# Patient Record
Sex: MALE | Race: WHITE | NOT HISPANIC OR LATINO | Employment: FULL TIME | URBAN - METROPOLITAN AREA
[De-identification: names, ages, dates, MRNs, and addresses within clinical notes are randomized per-mention and may not be internally consistent; named-entity substitution may affect disease eponyms.]

---

## 2021-08-31 ENCOUNTER — HOSPITAL ENCOUNTER (EMERGENCY)
Facility: MEDICAL CENTER | Age: 51
End: 2021-08-31
Attending: EMERGENCY MEDICINE
Payer: OTHER MISCELLANEOUS

## 2021-08-31 VITALS
RESPIRATION RATE: 16 BRPM | SYSTOLIC BLOOD PRESSURE: 110 MMHG | HEART RATE: 79 BPM | OXYGEN SATURATION: 96 % | WEIGHT: 234.13 LBS | TEMPERATURE: 97.6 F | DIASTOLIC BLOOD PRESSURE: 77 MMHG

## 2021-08-31 DIAGNOSIS — G89.29 CHRONIC PAIN OF LEFT KNEE: ICD-10-CM

## 2021-08-31 DIAGNOSIS — M25.562 CHRONIC PAIN OF LEFT KNEE: ICD-10-CM

## 2021-08-31 PROCEDURE — 99283 EMERGENCY DEPT VISIT LOW MDM: CPT

## 2021-08-31 PROCEDURE — A9270 NON-COVERED ITEM OR SERVICE: HCPCS | Performed by: EMERGENCY MEDICINE

## 2021-08-31 PROCEDURE — 700102 HCHG RX REV CODE 250 W/ 637 OVERRIDE(OP): Performed by: EMERGENCY MEDICINE

## 2021-08-31 RX ORDER — IBUPROFEN 600 MG/1
600 TABLET ORAL ONCE
Status: COMPLETED | OUTPATIENT
Start: 2021-08-31 | End: 2021-08-31

## 2021-08-31 RX ADMIN — IBUPROFEN 600 MG: 600 TABLET ORAL at 14:23

## 2021-08-31 ASSESSMENT — LIFESTYLE VARIABLES: DO YOU DRINK ALCOHOL: NO

## 2021-08-31 NOTE — ED PROVIDER NOTES
ED Provider Note    Scribed for Loretta Gonsalez M.D. by Samantha Deras. 8/31/2021, 1:29 PM.    Primary care provider: None  Means of arrival: Walk in  History obtained from: patient   History limited by: none    CHIEF COMPLAINT  Chief Complaint   Patient presents with   • Knee Pain     ambulates limping in triage c/o bilateral knee pain x 3 months. has hx of meniscus tear/surgery in left knee.        HPI  Augustus Burciaga is a 50 y.o. male who presents to the Emergency Department for bilateral knee pain. Patient has a history of spinal stenosis and states he has not received his injections for 6 weeks. He has increased pain in his back and has been walking differently causing increased knee pain. He has additional past medical history of meniscus tear and states he has chronic knee pain, but it is worse now.     REVIEW OF SYSTEMS  Pertinent positives include bilateral knee pain and back pain. Pertinent negatives include no weakness or falls.    PAST MEDICAL HISTORY   He has a history of spinal stenosis and mensicus tear    SURGICAL HISTORY  patient denies any surgical history    FAMILY HISTORY  Patient denies a pertinent family history.    CURRENT MEDICATIONS  Home Medications     Reviewed by Lachelle Marino R.N. (Registered Nurse) on 08/31/21 at 1221  Med List Status: Complete   Medication Last Dose Status        Patient Fly Taking any Medications                       ALLERGIES  No Known Allergies    PHYSICAL EXAM  VITAL SIGNS: /78   Pulse 75   Temp 36 °C (96.8 °F) (Temporal)   Resp 17   Wt 106 kg (234 lb 2.1 oz)   SpO2 93%   Constitutional: Alert in no apparent distress. Well appearing  HENT: Normocephalic, Atraumatic, Bilateral external ears normal. Nose normal.   Eyes:  Conjunctiva normal, non-icteric.   Lungs: Non-labored respirations  Skin: Warm, Dry, No erythema, No rash.   Neurologic: Alert, Grossly non-focal.   Psychiatric: Affect normal, Judgment normal, Mood normal, Appears appropriate and not  intoxicated.   Extremities: no significant knee swelling.    COURSE & MEDICAL DECISION MAKING  Pertinent Labs & Imaging studies reviewed. (See chart for details)    1:29 PM - Patient seen and examined at bedside. I informed the patient that most his symptoms are chronic and there is no emergent treatment needed from an ER standpoint.  I do not think imaging is indicated at this time.  Patient will be placed in a knee brace/immobilizer. recommended following with a PCP for management of his chronic problems. Patient will be treated with ibuprofen 600 mg. Recommended tylenol and ibuprofen at home as well as a knee brace from the drug store for additional support. Discussed return precautions. Patient will be discharged at this time. He verbalizes agreement with discharge and plan of care.       The patient will return for new or worsening symptoms and is stable at the time of discharge. Patient was given return precautions. Patient and/or family member verbalizes understanding and will comply.    DISPOSITION:  Patient will be discharged home in stable condition.    FOLLOW UP:  [facility], Emergency Dept  [address] [facility] [id number] [phone]    Return for worsening pain swelling fevers or other concerns.    [address] [id number] [phone]        [facility]  [address] [facility] [id number] [phone]        FINAL IMPRESSION  1. Chronic pain of left knee           This dictation has been created using voice recognition software and/or scribes. The accuracy of the dictation is limited by the abilities of the software and the expertise of the scribes. I expect there may be some errors of grammar and possibly content. I made every attempt to manually correct the errors within my dictation. However, errors related to voice recognition software and/or scribes may still exist and should be interpreted within  the appropriate context.     I, Samantha Deras (Scribe), am scribing for, and in the presence of, Loretta Gonsalez M.D..    Electronically signed by: Samantha Deras (Reece), 8/31/2021    I, Loretta Gonsalez M.D. personally performed the services described in this documentation, as scribed by Samantha Deras in my presence, and it is both accurate and complete. E    The note accurately reflects work and decisions made by me.  Loretta Gonsalez M.D.  8/31/2021  4:01 PM

## 2021-08-31 NOTE — ED NOTES
Pt report chronic knee pain to bilateral knees. Left > right. States he just moved to area and has not established with any physicians.

## 2021-08-31 NOTE — ED NOTES
DC home with written and verbal education for knee pain. He is ambulatory with knee immobilizer. Escorted to [facility]. All DC instructions with pt at time of DC

## 2021-10-11 ENCOUNTER — HOSPITAL ENCOUNTER (EMERGENCY)
Facility: MEDICAL CENTER | Age: 51
End: 2021-10-11
Attending: EMERGENCY MEDICINE
Payer: MEDICAID

## 2021-10-11 ENCOUNTER — APPOINTMENT (OUTPATIENT)
Dept: RADIOLOGY | Facility: MEDICAL CENTER | Age: 51
End: 2021-10-11
Attending: EMERGENCY MEDICINE
Payer: MEDICAID

## 2021-10-11 VITALS
SYSTOLIC BLOOD PRESSURE: 113 MMHG | WEIGHT: 219.8 LBS | OXYGEN SATURATION: 94 % | RESPIRATION RATE: 16 BRPM | HEIGHT: 73 IN | DIASTOLIC BLOOD PRESSURE: 92 MMHG | BODY MASS INDEX: 29.13 KG/M2 | TEMPERATURE: 97.8 F | HEART RATE: 61 BPM

## 2021-10-11 DIAGNOSIS — R50.9 FEVER, UNSPECIFIED FEVER CAUSE: ICD-10-CM

## 2021-10-11 DIAGNOSIS — R05.9 COUGH: ICD-10-CM

## 2021-10-11 LAB
ALBUMIN SERPL BCP-MCNC: 3.9 G/DL (ref 3.2–4.9)
ALBUMIN/GLOB SERPL: 1.1 G/DL
ALP SERPL-CCNC: 91 U/L (ref 30–99)
ALT SERPL-CCNC: 13 U/L (ref 2–50)
ANION GAP SERPL CALC-SCNC: 21 MMOL/L (ref 7–16)
AST SERPL-CCNC: 17 U/L (ref 12–45)
BASOPHILS # BLD AUTO: 0.4 % (ref 0–1.8)
BASOPHILS # BLD: 0.05 K/UL (ref 0–0.12)
BILIRUB SERPL-MCNC: 0.2 MG/DL (ref 0.1–1.5)
BUN SERPL-MCNC: 61 MG/DL (ref 8–22)
CALCIUM SERPL-MCNC: 9.1 MG/DL (ref 8.5–10.5)
CHLORIDE SERPL-SCNC: 97 MMOL/L (ref 96–112)
CO2 SERPL-SCNC: 16 MMOL/L (ref 20–33)
CREAT SERPL-MCNC: 6.75 MG/DL (ref 0.5–1.4)
EOSINOPHIL # BLD AUTO: 0.01 K/UL (ref 0–0.51)
EOSINOPHIL NFR BLD: 0.1 % (ref 0–6.9)
ERYTHROCYTE [DISTWIDTH] IN BLOOD BY AUTOMATED COUNT: 51.6 FL (ref 35.9–50)
FLUAV RNA SPEC QL NAA+PROBE: NEGATIVE
FLUBV RNA SPEC QL NAA+PROBE: NEGATIVE
GLOBULIN SER CALC-MCNC: 3.7 G/DL (ref 1.9–3.5)
GLUCOSE SERPL-MCNC: 101 MG/DL (ref 65–99)
HCT VFR BLD AUTO: 50.3 % (ref 42–52)
HGB BLD-MCNC: 16.6 G/DL (ref 14–18)
IMM GRANULOCYTES # BLD AUTO: 0.2 K/UL (ref 0–0.11)
IMM GRANULOCYTES NFR BLD AUTO: 1.6 % (ref 0–0.9)
LYMPHOCYTES # BLD AUTO: 1.97 K/UL (ref 1–4.8)
LYMPHOCYTES NFR BLD: 16 % (ref 22–41)
MCH RBC QN AUTO: 31.4 PG (ref 27–33)
MCHC RBC AUTO-ENTMCNC: 33 G/DL (ref 33.7–35.3)
MCV RBC AUTO: 95.1 FL (ref 81.4–97.8)
MONOCYTES # BLD AUTO: 1.23 K/UL (ref 0–0.85)
MONOCYTES NFR BLD AUTO: 10 % (ref 0–13.4)
NEUTROPHILS # BLD AUTO: 8.85 K/UL (ref 1.82–7.42)
NEUTROPHILS NFR BLD: 71.9 % (ref 44–72)
NRBC # BLD AUTO: 0 K/UL
NRBC BLD-RTO: 0 /100 WBC
NT-PROBNP SERPL IA-MCNC: 549 PG/ML (ref 0–125)
PLATELET # BLD AUTO: 177 K/UL (ref 164–446)
PMV BLD AUTO: 12.2 FL (ref 9–12.9)
POTASSIUM SERPL-SCNC: 5.6 MMOL/L (ref 3.6–5.5)
PROT SERPL-MCNC: 7.6 G/DL (ref 6–8.2)
RBC # BLD AUTO: 5.29 M/UL (ref 4.7–6.1)
RSV RNA SPEC QL NAA+PROBE: NEGATIVE
SARS-COV-2 RNA RESP QL NAA+PROBE: DETECTED
SODIUM SERPL-SCNC: 134 MMOL/L (ref 135–145)
SPECIMEN SOURCE: ABNORMAL
WBC # BLD AUTO: 12.3 K/UL (ref 4.8–10.8)

## 2021-10-11 PROCEDURE — 83880 ASSAY OF NATRIURETIC PEPTIDE: CPT

## 2021-10-11 PROCEDURE — 93005 ELECTROCARDIOGRAM TRACING: CPT | Performed by: EMERGENCY MEDICINE

## 2021-10-11 PROCEDURE — 80053 COMPREHEN METABOLIC PANEL: CPT

## 2021-10-11 PROCEDURE — 85025 COMPLETE CBC W/AUTO DIFF WBC: CPT

## 2021-10-11 PROCEDURE — 99284 EMERGENCY DEPT VISIT MOD MDM: CPT

## 2021-10-11 PROCEDURE — C9803 HOPD COVID-19 SPEC COLLECT: HCPCS | Performed by: EMERGENCY MEDICINE

## 2021-10-11 PROCEDURE — 0241U HCHG SARS-COV-2 COVID-19 NFCT DS RESP RNA 4 TRGT MIC: CPT

## 2021-10-11 PROCEDURE — 71045 X-RAY EXAM CHEST 1 VIEW: CPT

## 2021-10-11 NOTE — ED PROVIDER NOTES
ED Provider Note    Chief Complaint:   Shortness of breath, cough, fatigue    HPI:  Augustus Burciaga is a very pleasant 50-year-old gentleman who presents to the emergency department for cough, shortness of breath, and fatigue.  His symptoms began 7 days ago, and seemed to worsen during days 3 through 4.  He states he was very fatigued, and unable to get out of bed.  He does report cycling fevers, as well as persistent cough.  Though he is afebrile on arrival to the emergency department today.  He reports no significant past medical history, no history of pulmonary disease, no history of impaired immunity, no history of diabetes.  He states he does not take any medications on a regular basis.  He is not vaccinated for COVID-19, though states he would be amenable to Regeneron therapy.  Symptoms seem to have been mildly improving over the last 2 to 3 days, however of now seem to plateau.  He is not having any associated chest pain.  He is concerned because he is not improving, which prompted him to come to the emergency department today.  He is unable to identify any alleviating factors.    Review of Systems:  See HPI for pertinent positives and negatives. All other systems negative.    Past Medical History:   has a past medical history of Congestive heart failure (HCC).    Social History:  Social History     Tobacco Use   • Smoking status: Current Every Day Smoker     Packs/day: 1.00   • Smokeless tobacco: Never Used   Substance and Sexual Activity   • Alcohol use: Yes     Comment: occ   • Drug use: Never   • Sexual activity: Not on file       Surgical History:  patient denies any surgical history    Current Medications:  Home Medications     Reviewed by Krystle Contreras R.N. (Registered Nurse) on 10/11/21 at 1207  Med List Status: Partial   Medication Last Dose Status        Patient Fly Taking any Medications                       Allergies:  No Known Allergies    Physical Exam:  Vital Signs: /92   Pulse  "61   Temp 36.6 °C (97.8 °F) (Temporal)   Resp 16   Ht 1.854 m (6' 1\")   Wt 99.7 kg (219 lb 12.8 oz)   SpO2 94%   BMI 29.00 kg/m²   Constitutional: Alert, no acute distress  HENT: Normocephalic, mask in place  Eyes: Pupils equal and reactive, normal conjunctiva  Neck: Supple, normal range of motion, no stridor  Cardiovascular: Extremities are warm and well perfused, no murmur appreciated, normal cardiac auscultation, tachycardic rate  Pulmonary: No respiratory distress, normal work of breathing, no accessory muscule usage, breath sounds clear and equal bilaterally, no wheezing, no coarse breath sounds, room air oxygen saturation 92%  Abdomen: Soft, non-distended, non-tender to palpation, no peritoneal signs  Skin: Warm, dry, no rashes or lesions  Musculoskeletal: Normal range of motion in all extremities, no swelling or deformity noted  Neurologic: Alert, oriented, normal speech, normal motor function  Psychiatric: Normal and appropriate mood and affect    Medical records reviewed for continuity of care.  Mr. Burciaga was seen in this emergency department for knee pain 8/31/2021.  No other previous visits available for review in the system.    EKG: Rate 94, normal sinus rhythm, no ST elevation or depression, no pathologic T wave inversions, low voltage in limb leads.    Labs:  Labs Reviewed   CBC WITH DIFFERENTIAL - Abnormal; Notable for the following components:       Result Value    WBC 12.3 (*)     MCHC 33.0 (*)     RDW 51.6 (*)     Lymphocytes 16.00 (*)     Immature Granulocytes 1.60 (*)     Neutrophils (Absolute) 8.85 (*)     Monos (Absolute) 1.23 (*)     Immature Granulocytes (abs) 0.20 (*)     All other components within normal limits   COMP METABOLIC PANEL - Abnormal; Notable for the following components:    Sodium 134 (*)     Potassium 5.6 (*)     Co2 16 (*)     Anion Gap 21.0 (*)     Glucose 101 (*)     Bun 61 (*)     Creatinine 6.75 (*)     Globulin 3.7 (*)     All other components within normal " limits   COV-2, FLU A/B, AND RSV BY PCR - Abnormal; Notable for the following components:    SARS-CoV-2 by PCR DETECTED (*)     All other components within normal limits    Narrative:     Have you been in close contact with a person who is suspected  or known to be positive for COVID-19 within the last 30 days  (e.g. last seen that person < 30 days ago)->Unknown   PROBRAIN NATRIURETIC PEPTIDE, NT - Abnormal; Notable for the following components:    NT-proBNP 549 (*)     All other components within normal limits   ESTIMATED GFR - Abnormal; Notable for the following components:    GFR If  11 (*)     GFR If Non  9 (*)     All other components within normal limits       Radiology:  DX-CHEST-PORTABLE (1 VIEW)   Final Result      No evidence of acute cardiopulmonary process.           ED Medications Administered:  Medications - No data to display    Differential diagnosis:  Viral illness, COVID-19 pneumonia, community-acquired pneumonia, other upper respiratory infection, pulmonary edema    MDM:  Mr. Burciaga presents to the emergency department today for evaluation of persistent cough, cyclical fevers, and fatigue has been going on for the past 7 days.  Symptoms are concerning for COVID-19 pneumonia.  On arrival to the emergency department his heart rate is elevated to 106, he is afebrile.  On my initial physical examination he is not in any acute respiratory distress, he has clear breath sounds bilaterally, no wheezing.    Chest x-ray demonstrates no acute cardiopulmonary process.    I was notified by the patient's RN that he had removed his IV and walked out without informing staff.  When I went to speak to him, he was no longer on the premises.  A few minutes after he walked out his CMP resulted, creatinine is 6.75, with potassium of 5.6.  I immediately attempted to call him, however the phone number that we have on file, as well as the phone number for his emergency contact are no  longer in service. His COVID-19 PCR also resulted positive.     His address on file is the [facility]. I did enlist the help of our Community Health Worker to see if he can be reached at that facility and asked to return.    Personal protective equipment including N95 surgical respirator, goggles, and gloves were used during this encounter.       Disposition:  Eloped in guarded condition    Final Impression:  1. Cough    2. Fever, unspecified fever cause        Electronically signed by: Omayra Arcos MD, 10/12/2021 11:28 AM

## 2021-10-11 NOTE — LETTER
"10/12/2021               Augustus Gualberto  [address]   [place] [address]        Dear Augustus (MR#[id number]),    This letter is to inform you that your COVID-19 test result is POSITIVE.  This means that the virus that causes COVID-19 was found in your sample.      SARS-CoV-2 Source   Date Value Ref Range Status   10/11/2021 NP Swab  Final     SARS-CoV-2 by PCR   Date Value Ref Range Status   10/11/2021 DETECTED (AA)  Final     Comment:     PATIENTS: Important information regarding your results and instructions can  be found at [email or web address]   \"After your  Covid-19 Test\"    **The [employer] GeneXpert Xpress SARS-CoV-2 RT-PCR Test has been made  available for use under the Emergency Use Authorization (EUA) only.         If your symptoms are generally mild and stable, please isolate yourself at home. If it becomes difficult to breathe, contact your healthcare provider as soon as possible.    Next steps:  -  Stay home except to get medical care.  -  Follow the instructions for home isolation below.  -  Call ahead before visiting your healthcare provider or a hospital.  -  Go to the nearest emergency department for worsening symptoms including difficulty breathing, ongoing fever, weakness or chest pain.    You should isolate yourself:  -  For a minimum of 10 days from symptom onset or positive test and  -  At least 24 hours have passed since your last fever without the use of fever-reducing medications and  -  All other symptoms have improved (loss of taste and smell may persist for weeks or months after recovery and should not delay the end of isolation)    Instructions for Self-Isolation at Home:  -  We recommend you stay in your home and minimize contact with others to avoid spreading this infection.  -  Do not go to work, school or public areas unless otherwise directed by the health department. Avoid using public transportation, ridesharing or taxis.  -  Separate yourself from other people " in your home as much as possible.  -  Stay in a specific room and away from other people in your home as much as possible. Use a separate bathroom if possible.        When seeking care at a healthcare facility:  -  Seek prompt medical attention if your illness is worsening (e.g., difficulty breathing).  -  When possible, call the healthcare provider before arriving.  -  Put on a facemask before you enter the facility.  -  If possible, put on a facemask before the ambulance or paramedics arrive.  -  These steps will help the healthcare provider's office prevent other people from getting infected or exposed.    Once any symptoms have resolved, it may be possible to donate plasma to help others that are currently ill with COVID-19. To learn more and apply, please contact the  at [phone] or via e-mail at [email or web address].    For any further questions regarding COVID-19, please contact your [facility]'s health department or healthcare provider.        Sincerely,    [facility] Care Team

## 2021-10-11 NOTE — ED TRIAGE NOTES
Vitals:    10/11/21 1158   BP: 113/92   Pulse: (!) 106   Resp: 16   Temp: 36.6 °C (97.8 °F)   SpO2: 92%     Chief Complaint   Patient presents with   • Flu Like Symptoms   • Shortness of Breath     Pt c/o SOB, stuffy nose, and fatigue for days. He has not been vaccinated for COVID-19 or tested. Hx CHF and takes lisinopril, torsemide, and metoprolol.     Protocol ordered.

## 2021-10-12 LAB — EKG IMPRESSION: NORMAL

## 2021-10-12 NOTE — DISCHARGE PLANNING
note:  Pt called because [facility] him [facility] about his [facility] Result. Call transferred to Daisy Forte.

## 2021-10-12 NOTE — ED NOTES
Patient's emergency contact on file called back and was informed of positive COVID result. Patient is at another [facility] hospital at this time, and they will address the result.    Appreciated the information.    Daisy Fournier, PharmD

## 2021-10-26 ENCOUNTER — HOSPITAL ENCOUNTER (EMERGENCY)
Facility: MEDICAL CENTER | Age: 51
End: 2021-10-26
Payer: MEDICAID

## 2021-10-26 VITALS
RESPIRATION RATE: 20 BRPM | BODY MASS INDEX: 29.81 KG/M2 | TEMPERATURE: 97.9 F | HEIGHT: 72 IN | HEART RATE: 86 BPM | SYSTOLIC BLOOD PRESSURE: 91 MMHG | OXYGEN SATURATION: 93 % | DIASTOLIC BLOOD PRESSURE: 69 MMHG

## 2021-10-26 PROCEDURE — 302449 STATCHG TRIAGE ONLY (STATISTIC)

## 2021-11-01 NOTE — INFECTION CONTROL
This patient is considered COVID RECOVERED.    Patient initially tested positive for COVID on 10/11/2021.  Patient is greater than or equal to 21 days from symptom onset and/or positive test, with symptom improvement.  Per the CDC guidance, this patient no longer requires transmission based precautions.  Patient may be placed on any unit per the bed assignment policy, including placement in a semi-private room with a roommate.

## 2021-12-20 ENCOUNTER — APPOINTMENT (OUTPATIENT)
Dept: RADIOLOGY | Facility: MEDICAL CENTER | Age: 51
End: 2021-12-20
Attending: EMERGENCY MEDICINE
Payer: OTHER MISCELLANEOUS

## 2021-12-20 ENCOUNTER — HOSPITAL ENCOUNTER (EMERGENCY)
Facility: MEDICAL CENTER | Age: 51
End: 2021-12-20
Attending: EMERGENCY MEDICINE
Payer: OTHER MISCELLANEOUS

## 2021-12-20 VITALS
RESPIRATION RATE: 20 BRPM | TEMPERATURE: 98 F | HEIGHT: 72 IN | BODY MASS INDEX: 28.16 KG/M2 | DIASTOLIC BLOOD PRESSURE: 91 MMHG | OXYGEN SATURATION: 94 % | HEART RATE: 64 BPM | WEIGHT: 207.89 LBS | SYSTOLIC BLOOD PRESSURE: 155 MMHG

## 2021-12-20 DIAGNOSIS — L03.115 CELLULITIS OF RIGHT LOWER EXTREMITY: ICD-10-CM

## 2021-12-20 DIAGNOSIS — Z76.0 MEDICATION REFILL: ICD-10-CM

## 2021-12-20 LAB
ANION GAP SERPL CALC-SCNC: 12 MMOL/L (ref 7–16)
BASOPHILS # BLD AUTO: 0.6 % (ref 0–1.8)
BASOPHILS # BLD: 0.07 K/UL (ref 0–0.12)
BUN SERPL-MCNC: 12 MG/DL (ref 8–22)
CALCIUM SERPL-MCNC: 9.4 MG/DL (ref 8.5–10.5)
CHLORIDE SERPL-SCNC: 104 MMOL/L (ref 96–112)
CO2 SERPL-SCNC: 24 MMOL/L (ref 20–33)
CREAT SERPL-MCNC: 0.74 MG/DL (ref 0.5–1.4)
EOSINOPHIL # BLD AUTO: 0.12 K/UL (ref 0–0.51)
EOSINOPHIL NFR BLD: 1.1 % (ref 0–6.9)
ERYTHROCYTE [DISTWIDTH] IN BLOOD BY AUTOMATED COUNT: 50.5 FL (ref 35.9–50)
GLUCOSE SERPL-MCNC: 101 MG/DL (ref 65–99)
HCT VFR BLD AUTO: 48.2 % (ref 42–52)
HGB BLD-MCNC: 16.5 G/DL (ref 14–18)
IMM GRANULOCYTES # BLD AUTO: 0.05 K/UL (ref 0–0.11)
IMM GRANULOCYTES NFR BLD AUTO: 0.5 % (ref 0–0.9)
LYMPHOCYTES # BLD AUTO: 1.86 K/UL (ref 1–4.8)
LYMPHOCYTES NFR BLD: 17.1 % (ref 22–41)
MCH RBC QN AUTO: 30.4 PG (ref 27–33)
MCHC RBC AUTO-ENTMCNC: 34.2 G/DL (ref 33.7–35.3)
MCV RBC AUTO: 88.9 FL (ref 81.4–97.8)
MONOCYTES # BLD AUTO: 0.69 K/UL (ref 0–0.85)
MONOCYTES NFR BLD AUTO: 6.4 % (ref 0–13.4)
NEUTROPHILS # BLD AUTO: 8.07 K/UL (ref 1.82–7.42)
NEUTROPHILS NFR BLD: 74.3 % (ref 44–72)
NRBC # BLD AUTO: 0 K/UL
NRBC BLD-RTO: 0 /100 WBC
PLATELET # BLD AUTO: 336 K/UL (ref 164–446)
PMV BLD AUTO: 11.2 FL (ref 9–12.9)
POTASSIUM SERPL-SCNC: 4.1 MMOL/L (ref 3.6–5.5)
RBC # BLD AUTO: 5.42 M/UL (ref 4.7–6.1)
SODIUM SERPL-SCNC: 140 MMOL/L (ref 135–145)
WBC # BLD AUTO: 10.9 K/UL (ref 4.8–10.8)

## 2021-12-20 PROCEDURE — 85025 COMPLETE CBC W/AUTO DIFF WBC: CPT

## 2021-12-20 PROCEDURE — 99283 EMERGENCY DEPT VISIT LOW MDM: CPT

## 2021-12-20 PROCEDURE — A9270 NON-COVERED ITEM OR SERVICE: HCPCS | Performed by: EMERGENCY MEDICINE

## 2021-12-20 PROCEDURE — 93971 EXTREMITY STUDY: CPT | Mod: RT

## 2021-12-20 PROCEDURE — 700102 HCHG RX REV CODE 250 W/ 637 OVERRIDE(OP): Performed by: EMERGENCY MEDICINE

## 2021-12-20 PROCEDURE — 80048 BASIC METABOLIC PNL TOTAL CA: CPT

## 2021-12-20 RX ORDER — CEPHALEXIN 500 MG/1
500 CAPSULE ORAL 4 TIMES DAILY
Qty: 28 CAPSULE | Refills: 0 | Status: SHIPPED | OUTPATIENT
Start: 2021-12-20 | End: 2021-12-27

## 2021-12-20 RX ORDER — TORSEMIDE 20 MG/1
20 TABLET ORAL DAILY
Status: SHIPPED | COMMUNITY
End: 2021-12-20 | Stop reason: SDUPTHER

## 2021-12-20 RX ORDER — TORSEMIDE 20 MG/1
20 TABLET ORAL DAILY
Qty: 30 TABLET | Refills: 0 | Status: SHIPPED | OUTPATIENT
Start: 2021-12-20 | End: 2022-01-19

## 2021-12-20 RX ORDER — GABAPENTIN 400 MG/1
800 CAPSULE ORAL 3 TIMES DAILY
Status: SHIPPED | COMMUNITY
End: 2021-12-20 | Stop reason: SDUPTHER

## 2021-12-20 RX ORDER — CEPHALEXIN 500 MG/1
500 CAPSULE ORAL ONCE
Status: COMPLETED | OUTPATIENT
Start: 2021-12-20 | End: 2021-12-20

## 2021-12-20 RX ORDER — GABAPENTIN 400 MG/1
800 CAPSULE ORAL 3 TIMES DAILY
Qty: 90 CAPSULE | Refills: 0 | Status: SHIPPED | OUTPATIENT
Start: 2021-12-20

## 2021-12-20 RX ADMIN — CEPHALEXIN 500 MG: 500 CAPSULE ORAL at 09:45

## 2021-12-20 ASSESSMENT — FIBROSIS 4 INDEX: FIB4 SCORE: 1.33

## 2021-12-20 ASSESSMENT — LIFESTYLE VARIABLES: DO YOU DRINK ALCOHOL: NO

## 2021-12-20 NOTE — ED TRIAGE NOTES
Chief Complaint   Patient presents with   • Foot Pain   • Foot Swelling     Pt ambulatory to triage for above complaint. Pt denies any trauma to right foot. Swelling and pain localized to right foot only. CMS intact, no wounds noted. Pt states he takes Gabapentin for neuropathy but has been staying [facility] the homeless shelter and is unable to get prescription. Hx CHF. Denies any other symptoms.     Pt is alert/oriented and follows commands. Pt speaking in full sentences and responds appropriately to questions. No acute distress noted in triage and respirations are even and unlabored.     Pt placed in lobby and educated on triage process. Pt encouraged to alert staff for any changes in condition.

## 2021-12-20 NOTE — ED PROVIDER NOTES
"ED Provider Note    CHIEF COMPLAINT  Chief Complaint   Patient presents with   • Foot Pain   • Foot Swelling       HPI  Augustus Burciaga is a 51 y.o. male who presents with a chief complaint of right foot pain and swelling that has been ongoing for the past 2 to 3 days.  He notes that this is happened in the past and it typically resolves on its own.  He denies any history of DVT or PE.  He does have a recent history of COVID-19 and was hospitalized at [place] with renal failure requiring temporary dialysis.  He is out of his gabapentin and torsemide and is unable to pay for these medications.  He denies any IV drug use but does use alcohol on occasion.  No fevers or chills, chest pain or shortness of breath, cough or cold symptoms.    REVIEW OF SYSTEMS  See HPI for further details.  Right foot pain and swelling.  All other systems are negative.     PAST MEDICAL HISTORY   has a past medical history of Congestive heart failure (HCC) and Neuropathy (2018).    SOCIAL HISTORY  Social History     Tobacco Use   • Smoking status: Current Every Day Smoker     Packs/day: 1.00   • Smokeless tobacco: Never Used   Vaping Use   • Vaping Use: Never used   Substance and Sexual Activity   • Alcohol use: Yes     Comment: \"Once a month\"   • Drug use: Never   • Sexual activity: Not on file       SURGICAL HISTORY  patient denies any surgical history    CURRENT MEDICATIONS  Home Medications     Reviewed by Lu Varela R.N. (Registered Nurse) on 12/20/21 at 0648  Med List Status: Not Addressed   Medication Last Dose Status        Patient Fly Taking any Medications                       ALLERGIES  No Known Allergies    PHYSICAL EXAM  VITAL SIGNS: /102   Pulse 88   Temp 35.9 °C (96.6 °F) (Temporal)   Resp 18   Ht 1.829 m (6')   Wt 94.3 kg (207 lb 14.3 oz)   SpO2 97%   BMI 28.20 kg/m²   Pulse ox interpretation: I interpret this pulse ox as normal.  Constitutional: Alert in no apparent distress.  HENT: No signs of " trauma, Bilateral external ears normal, Nose normal.  Moist mucous membranes.  Eyes: Pupils are equal and reactive, Conjunctiva normal, Non-icteric.   Neck: Normal range of motion, No tenderness, Supple, No stridor.   Lymphatic: No lymphadenopathy noted.   Cardiovascular: Regular rate and rhythm, no murmurs. Pulses symmetrical.  Thorax & Lungs: Normal breath sounds, No respiratory distress, No wheezing, No chest tenderness.   Abdomen: Bowel sounds normal, Soft, No tenderness, No masses, No pulsatile masses. No peritoneal signs.  Skin: Warm, Dry, No rash.   Back: Normal alignment.  Extremities: Intact distal pulses, edema and tenderness in the right leg and foot with associated erythema but no open wounds, No cyanosis.  Musculoskeletal: Good range of motion in all major joints. No major deformities noted.   Neurologic: Alert, Normal motor function, Normal sensory function, No focal deficits noted.   Psychiatric: Affect normal, Judgment normal, Mood normal.     DIAGNOSTIC STUDIES / PROCEDURES    LABS  Results for orders placed or performed during the hospital encounter of 12/20/21   CBC WITH DIFFERENTIAL   Result Value Ref Range    WBC 10.9 (H) 4.8 - 10.8 K/uL    RBC 5.42 4.70 - 6.10 M/uL    Hemoglobin 16.5 14.0 - 18.0 g/dL    Hematocrit 48.2 42.0 - 52.0 %    MCV 88.9 81.4 - 97.8 fL    MCH 30.4 27.0 - 33.0 pg    MCHC 34.2 33.7 - 35.3 g/dL    RDW 50.5 (H) 35.9 - 50.0 fL    Platelet Count 336 164 - 446 K/uL    MPV 11.2 9.0 - 12.9 fL    Neutrophils-Polys 74.30 (H) 44.00 - 72.00 %    Lymphocytes 17.10 (L) 22.00 - 41.00 %    Monocytes 6.40 0.00 - 13.40 %    Eosinophils 1.10 0.00 - 6.90 %    Basophils 0.60 0.00 - 1.80 %    Immature Granulocytes 0.50 0.00 - 0.90 %    Nucleated RBC 0.00 /100 WBC    Neutrophils (Absolute) 8.07 (H) 1.82 - 7.42 K/uL    Lymphs (Absolute) 1.86 1.00 - 4.80 K/uL    Monos (Absolute) 0.69 0.00 - 0.85 K/uL    Eos (Absolute) 0.12 0.00 - 0.51 K/uL    Baso (Absolute) 0.07 0.00 - 0.12 K/uL    Immature  Granulocytes (abs) 0.05 0.00 - 0.11 K/uL    NRBC (Absolute) 0.00 K/uL   Basic Metabolic Panel   Result Value Ref Range    Sodium 140 135 - 145 mmol/L    Potassium 4.1 3.6 - 5.5 mmol/L    Chloride 104 96 - 112 mmol/L    Co2 24 20 - 33 mmol/L    Glucose 101 (H) 65 - 99 mg/dL    Bun 12 8 - 22 mg/dL    Creatinine 0.74 0.50 - 1.40 mg/dL    Calcium 9.4 8.5 - 10.5 mg/dL    Anion Gap 12.0 7.0 - 16.0   ESTIMATED GFR   Result Value Ref Range    GFR If African American >60 >60 mL/min/1.73 m 2    GFR If Non African American >60 >60 mL/min/1.73 m 2     RADIOLOGY  US-EXTREMITY VENOUS LOWER UNILAT RIGHT   Final Result          COURSE & MEDICAL DECISION MAKING  Pertinent Labs & Imaging studies reviewed. (See chart for details)  Records obtained and reviewed: Patient was most recently seen in this ED 10/11/2021 for shortness of breath, cough, and fatigue.  Labs were drawn and a chest x-ray was reassuring.  Patient unfortunately removed his own IV and eloped from the ED.  His COVID-19 PCR was positive.    This is a 51-year-old male who is here with right-sided leg swelling. Differential diagnosis includes, but is not limited to, DVT, cellulitis, venous insufficiency, unlikely to be heart failure exacerbation. Arrives afebrile with normal vital signs. Appears well-hydrated and nontoxic. Right lower extremity is edematous with mild associated erythema and tenderness. No obvious open wounds. He is neurovascularly intact in the right leg. He notes that he has been out of his torsemide and gabapentin and is requesting refills of these medications.     Thankfully DVT ultrasound did not demonstrate any clots either superficially or deep. Labs are reassuring. He does have a mild leukocytosis but no fever or tachycardia to suggest sepsis. Patient has no shortness of breath, rales, hypoxia to suggest heart failure exacerbation it would be unlikely to have just 1 leg swollen with CHF exacerbation. Torsemide and gabapentin will be refilled.  Patient was started on Keflex for cellulitis. He will follow-up with his primary care physician and was given strict return precautions to come back to the ER if the Keflex does not improve his symptoms. Discharged in good and stable condition with strict return precautions.    The patient will not drink alcohol nor drive with prescribed medications. The patient will return for worsening symptoms and is stable at the time of discharge. The patient verbalizes understanding and will comply.    FINAL IMPRESSION  1. Cellulitis of right lower extremity  cephALEXin (KEFLEX) 500 MG Cap   2. Medication refill  torsemide (DEMADEX) 20 MG Tab    gabapentin (NEURONTIN) 400 MG Cap         Electronically signed by: Agustin Delong M.D., 12/20/2021 8:07 AM

## 2021-12-21 ENCOUNTER — PHARMACY VISIT (OUTPATIENT)
Dept: PHARMACY | Facility: MEDICAL CENTER | Age: 51
End: 2021-12-21
Payer: COMMERCIAL

## 2021-12-21 PROCEDURE — RXMED WILLOW AMBULATORY MEDICATION CHARGE: Performed by: EMERGENCY MEDICINE

## 2021-12-21 RX ORDER — CEPHALEXIN 500 MG/1
500 CAPSULE ORAL 4 TIMES DAILY
Qty: 28 CAPSULE | Refills: 0 | OUTPATIENT
Start: 2021-12-21 | End: 2021-12-28

## 2021-12-21 RX ORDER — GABAPENTIN 400 MG/1
800 CAPSULE ORAL 3 TIMES DAILY
Qty: 90 CAPSULE | Refills: 0 | OUTPATIENT
Start: 2021-12-21

## 2021-12-21 RX ORDER — TORSEMIDE 20 MG/1
20 TABLET ORAL DAILY
Qty: 30 TABLET | Refills: 0 | OUTPATIENT
Start: 2021-12-21 | End: 2022-01-20

## 2021-12-29 ENCOUNTER — PHARMACY VISIT (OUTPATIENT)
Dept: PHARMACY | Facility: MEDICAL CENTER | Age: 51
End: 2021-12-29
Payer: COMMERCIAL

## 2021-12-29 PROCEDURE — RXMED WILLOW AMBULATORY MEDICATION CHARGE

## 2021-12-29 RX ORDER — OMEPRAZOLE 20 MG/1
CAPSULE, DELAYED RELEASE ORAL
Qty: 90 CAPSULE | Refills: 0 | OUTPATIENT
Start: 2021-09-13

## 2021-12-29 RX ORDER — LISINOPRIL 40 MG/1
TABLET ORAL
Qty: 90 TABLET | Refills: 1 | OUTPATIENT
Start: 2021-09-13

## 2025-06-02 NOTE — DISCHARGE INSTRUCTIONS
Eloped prior to discharge without notifying staff.    Detail Level: Generalized Quality 226: Preventive Care And Screening: Tobacco Use: Screening And Cessation Intervention: Patient screened for tobacco use and is an ex/non-smoker